# Patient Record
Sex: MALE | Race: WHITE | URBAN - METROPOLITAN AREA
[De-identification: names, ages, dates, MRNs, and addresses within clinical notes are randomized per-mention and may not be internally consistent; named-entity substitution may affect disease eponyms.]

---

## 2017-06-03 ENCOUNTER — INPATIENT (INPATIENT)
Facility: HOSPITAL | Age: 32
LOS: 0 days | Discharge: HOME | End: 2017-06-04
Attending: HOSPITALIST

## 2017-06-03 DIAGNOSIS — R56.9 UNSPECIFIED CONVULSIONS: ICD-10-CM

## 2017-06-03 DIAGNOSIS — M62.82 RHABDOMYOLYSIS: ICD-10-CM

## 2017-06-28 DIAGNOSIS — F17.210 NICOTINE DEPENDENCE, CIGARETTES, UNCOMPLICATED: ICD-10-CM

## 2017-06-28 DIAGNOSIS — F12.288 CANNABIS DEPENDENCE WITH OTHER CANNABIS-INDUCED DISORDER: ICD-10-CM

## 2017-06-28 DIAGNOSIS — F14.90 COCAINE USE, UNSPECIFIED, UNCOMPLICATED: ICD-10-CM

## 2017-06-28 DIAGNOSIS — R56.9 UNSPECIFIED CONVULSIONS: ICD-10-CM

## 2017-06-28 DIAGNOSIS — M62.82 RHABDOMYOLYSIS: ICD-10-CM

## 2017-06-28 DIAGNOSIS — R74.8 ABNORMAL LEVELS OF OTHER SERUM ENZYMES: ICD-10-CM

## 2017-06-28 DIAGNOSIS — M54.9 DORSALGIA, UNSPECIFIED: ICD-10-CM

## 2017-06-28 DIAGNOSIS — D72.828 OTHER ELEVATED WHITE BLOOD CELL COUNT: ICD-10-CM

## 2018-03-12 ENCOUNTER — OUTPATIENT (OUTPATIENT)
Dept: OUTPATIENT SERVICES | Facility: HOSPITAL | Age: 33
LOS: 1 days | Discharge: HOME | End: 2018-03-12

## 2018-03-12 DIAGNOSIS — Z31.440 ENCOUNTER OF MALE FOR TESTING FOR GENETIC DISEASE CARRIER STATUS FOR PROCREATIVE MANAGEMENT: ICD-10-CM

## 2018-12-10 ENCOUNTER — EMERGENCY (EMERGENCY)
Facility: HOSPITAL | Age: 33
LOS: 0 days | Discharge: HOME | End: 2018-12-10
Attending: EMERGENCY MEDICINE | Admitting: EMERGENCY MEDICINE

## 2018-12-10 VITALS
WEIGHT: 175.05 LBS | DIASTOLIC BLOOD PRESSURE: 57 MMHG | TEMPERATURE: 98 F | HEIGHT: 71 IN | HEART RATE: 79 BPM | RESPIRATION RATE: 18 BRPM | SYSTOLIC BLOOD PRESSURE: 103 MMHG

## 2018-12-10 DIAGNOSIS — R10.9 UNSPECIFIED ABDOMINAL PAIN: ICD-10-CM

## 2018-12-10 DIAGNOSIS — K46.9 UNSPECIFIED ABDOMINAL HERNIA WITHOUT OBSTRUCTION OR GANGRENE: ICD-10-CM
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
## 2018-12-10 DIAGNOSIS — R10.31 RIGHT LOWER QUADRANT PAIN: ICD-10-CM
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
## 2018-12-10 PROBLEM — Z00.00 ENCOUNTER FOR PREVENTIVE HEALTH EXAMINATION: Status: ACTIVE | Noted: 2018-12-10

## 2018-12-10 NOTE — PROGRESS NOTE ADULT - SUBJECTIVE AND OBJECTIVE BOX
WILLIAN ISRAEL [id number]  33y Male      HPI: 33 yr old male with c/o right groin pain and "popping" sensation, which he experienced after trying to move a 600 lb drum while at work today. HE then noticed a bulge in right groind area, which re-occurs if he bears down or stands up.       PAST MEDICAL & SURGICAL HISTORY:  s/p umbilical & LIH repair (205 0r 2016, Dr. Pinedo)        MEDICATIONS  (STANDING):    MEDICATIONS  (PRN):      Allergies    No Known Allergies    Intolerances    Social History:  (+) tobacco  (+)marijuana daily  (+)social ETOH    REVIEW OF SYSTEMS    [ ] A ten-point review of systems was otherwise negative except as noted.  [ ] Due to altered mental status/intubation, subjective information were not able to be obtained from the patient. History was obtained, to the extent possible, from review of the chart and collateral sources of information.      Vital Signs Last 24 Hrs  T(C): 36.4 (10 Dec 2018 11:51), Max: 36.4 (10 Dec 2018 11:51)  T(F): 97.6 (10 Dec 2018 11:51), Max: 97.6 (10 Dec 2018 11:51)  HR: 79 (10 Dec 2018 11:51) (79 - 79)  BP: 103/57 (10 Dec 2018 11:51) (103/57 - 103/57)  BP(mean): --  RR: 18 (10 Dec 2018 11:51) (18 - 18)  SpO2: --    PHYSICAL EXAM:  GENERAL: NAD, well-appearing  ABDOMEN: Soft, (+)reducible right inguinal hernia, (+) mildly tender; no skin changes noted      LABS:                      LFTs:         Coags:                RADIOLOGY & ADDITIONAL STUDIES:

## 2018-12-10 NOTE — ED PROVIDER NOTE - NSFOLLOWUPINSTRUCTIONS_ED_ALL_ED_FT
Hernia    A hernia is when fat or the intestines push through a weak area in the abdominal wall. This can occur around the belly button (umbilical hernia) or where the leg meets the lower abdomen (inguinal hernia). This creates a rounded lump (bulge). Hernias that can be pushed back into the belly are called reducible and those that cannot are called incarcerated. Hernias that are not reducible and lose their blood supply are called strangulated and require emergency surgery. Hernias can be caused or worsened when straining during bowel movements or lifting heavy objects as well as coughing or sneezing. Surgery may be helpful in treating this condition so follow up with a surgeon.    SEEK IMMEDIATE MEDICAL CARE IF YOU HAVE ANY OF THE FOLLOWING SYMPTOMS: worsening abdominal pain, change in color over the hernia, nausea/vomiting, or inability to have a bowel movement or pass gas.

## 2018-12-10 NOTE — ED PROVIDER NOTE - OBJECTIVE STATEMENT
32yo M presents to the ED c/o righr sided groin pain after moving a heavy object at work. Pt states that he was in severe pain at that time so someone called 911. Upon arrival of the ambulance, pt noted to have a lump to the right groin which he was able to push back in. Pt denies any urinary symptoms, no saddle anesthesia, no B/B incontinence. Pt has a h/o hernias in the past and had surgery done by Dr. Mayen.

## 2018-12-10 NOTE — CONSULT NOTE ADULT - SUBJECTIVE AND OBJECTIVE BOX
WILLIAN ISRAEL [id number]  33y Male      HPI: 33 yr old male with c/o right groin pain and "popping" sensation, which he experienced after trying to move a 600 lb drum while at work today. HE then noticed a bulge in right groind area, which re-occurs if he bears down or stands up.       PAST MEDICAL & SURGICAL HISTORY:  s/p umbilical & LIH repair (205 0r 2016, Dr. Pinedo)        MEDICATIONS  (STANDING):    MEDICATIONS  (PRN):      Allergies    No Known Allergies    Intolerances    Social History:  (+) tobacco  (+)marijuana daily  (+)social ETOH    REVIEW OF SYSTEMS    [ ] A ten-point review of systems was otherwise negative except as noted.  [ ] Due to altered mental status/intubation, subjective information were not able to be obtained from the patient. History was obtained, to the extent possible, from review of the chart and collateral sources of information.      Vital Signs Last 24 Hrs  T(C): 36.4 (10 Dec 2018 11:51), Max: 36.4 (10 Dec 2018 11:51)  T(F): 97.6 (10 Dec 2018 11:51), Max: 97.6 (10 Dec 2018 11:51)  HR: 79 (10 Dec 2018 11:51) (79 - 79)  BP: 103/57 (10 Dec 2018 11:51) (103/57 - 103/57)  BP(mean): --  RR: 18 (10 Dec 2018 11:51) (18 - 18)  SpO2: --    PHYSICAL EXAM:  GENERAL: NAD, well-appearing  ABDOMEN: Soft, (+)reducible right inguinal hernia, (+) mildly tender; no skin changes noted      LABS:  n/a    RADIOLOGY & ADDITIONAL STUDIES:  n/a WILLIAN ISRAEL [id number]  33y Male      HPI: 33 yr old male with c/o right groin pain and "popping" sensation, which he experienced after trying to move a 600 lb drum while at work today. HE then noticed a bulge in right groind area, which re-occurs if he bears down or stands up. No N/V;, unsure if he passed flatus      PAST MEDICAL & SURGICAL HISTORY:  s/p umbilical & LIH repair (20i5 0r 2016, Dr. Pinedo)    Home Medications:  none    MEDICATIONS  (STANDING):    MEDICATIONS  (PRN):      Allergies    No Known Allergies    Intolerances    Social History:  (+) tobacco  (+)marijuana daily  (+)social ETOH    REVIEW OF SYSTEMS    [ ] A ten-point review of systems was otherwise negative except as noted.  [ ] Due to altered mental status/intubation, subjective information were not able to be obtained from the patient. History was obtained, to the extent possible, from review of the chart and collateral sources of information.      Vital Signs Last 24 Hrs  T(C): 36.4 (10 Dec 2018 11:51), Max: 36.4 (10 Dec 2018 11:51)  T(F): 97.6 (10 Dec 2018 11:51), Max: 97.6 (10 Dec 2018 11:51)  HR: 79 (10 Dec 2018 11:51) (79 - 79)  BP: 103/57 (10 Dec 2018 11:51) (103/57 - 103/57)  BP(mean): --  RR: 18 (10 Dec 2018 11:51) (18 - 18)  SpO2: --    PHYSICAL EXAM:  GENERAL: NAD, well-appearing  ABDOMEN: Soft, (+)reducible right inguinal hernia, (+) mildly tender; no skin changes noted      LABS:  n/a    RADIOLOGY & ADDITIONAL STUDIES:  n/a

## 2018-12-10 NOTE — ED PROVIDER NOTE - PROGRESS NOTE DETAILS
spoke to surgical pa will come to evaluate patient surgical pa examined patient . she will discuss with attending . patient seen by surgical resident . they discussed with attending . patient will call today to get appointment for joaquín

## 2018-12-10 NOTE — ED ADULT NURSE NOTE - NSIMPLEMENTINTERV_GEN_ALL_ED
Implemented All Universal Safety Interventions:  [place] to call system. Call bell, personal items and telephone within reach. Instruct patient to call for assistance. Room bathroom lighting operational. Non-slip footwear when patient is off stretcher. Physically safe environment: no spills, clutter or unnecessary equipment. Stretcher in lowest position, wheels locked, appropriate side rails in place.

## 2018-12-10 NOTE — ED PROVIDER NOTE - NS ED ROS FT
Review of Systems    Constitutional: (-) fever/ chills (-) weight loss  Cardiovascular: (-) chest pain, (-) syncope (-) palpitations  Respiratory: (-) cough, (-) shortness of breath  Gastrointestinal: (-) vomiting, (-) diarrhea (-) abdominal pain, (+) right groin pain  Musculoskeletal: (-) neck pain, (-) back pain, (-) joint pain (-) pedal edema   Integumentary: (-) rash, (-) swelling

## 2018-12-10 NOTE — CONSULT NOTE ADULT - ASSESSMENT
Right inguinal hernia, reducible     - Case D/W Dr. Pinedo: no acute intervention. Pt melina F/U in office tomorrow for further evaluation and possible hernia repair   - Pt instructed upon reasons to return to ER Right inguinal hernia, reducible     - Case D/W Dr. Pinedo: no acute intervention. Pt may F/U in office tomorrow for further evaluation for possible hernia repair   - Pt instructed upon reasons to return to ER

## 2018-12-10 NOTE — ED PROVIDER NOTE - PHYSICAL EXAMINATION
VITAL SIGNS: I have reviewed nursing notes and confirm.  CONSTITUTIONAL: well-appearing, non-toxic, NAD  SKIN: Warm dry, normal skin turgor  CARD: RRR, no murmurs, rubs or gallops  RESP: clear to ausculation b/l.  No rales, rhonchi, or wheezing.  ABD: soft, + BS, non-tender, non-distended, no rebound or guarding. No CVA tenderness, (+) tenderness over the right suprapubic area, not able to palpate a bulge.   EXT: Full ROM, no bony tenderness, no pedal edema, no calf tenderness  NEURO: normal motor. normal sensory. CN II-XII intact. Cerebellar testing normal. Normal gait.  PSYCH: Cooperative, appropriate.

## 2018-12-10 NOTE — ED PROVIDER NOTE - ATTENDING CONTRIBUTION TO CARE
Pt with c/o painful lump to right groin after pushing heavy object.  Pt with easily reducible right hernia.  Surgery eval pt in the ED and will f/u as outpt.  Patient will be discharged from the ED. Verbal instructions were given, including instructions to return to ED immediately for any new, worsening, or concerning symptoms. Patient endorsed understanding. Written discharge instructions additionally given, including follow-up plan.

## 2018-12-10 NOTE — ED PROVIDER NOTE - CARE PROVIDER_API CALL
Jomar Pinedo), Surgery  [facility] [place] [address]  Phone: [phone]  Fax: [phone]

## 2018-12-11 ENCOUNTER — APPOINTMENT (OUTPATIENT)
Dept: SURGERY | Facility: CLINIC | Age: 33
End: 2018-12-11

## 2018-12-13 ENCOUNTER — APPOINTMENT (OUTPATIENT)
Age: 33
End: 2018-12-13
Payer: OTHER MISCELLANEOUS

## 2018-12-13 ENCOUNTER — APPOINTMENT (OUTPATIENT)
Dept: SURGERY | Facility: CLINIC | Age: 33
End: 2018-12-13

## 2018-12-13 ENCOUNTER — FORM ENCOUNTER (OUTPATIENT)
Age: 33
End: 2018-12-13

## 2018-12-13 VITALS
SYSTOLIC BLOOD PRESSURE: 110 MMHG | DIASTOLIC BLOOD PRESSURE: 80 MMHG | BODY MASS INDEX: 25.05 KG/M2 | WEIGHT: 175 LBS | HEIGHT: 70 IN

## 2018-12-13 DIAGNOSIS — F17.200 NICOTINE DEPENDENCE, UNSPECIFIED, UNCOMPLICATED: ICD-10-CM

## 2018-12-13 PROCEDURE — 99243 OFF/OP CNSLTJ NEW/EST LOW 30: CPT

## 2018-12-14 ENCOUNTER — OUTPATIENT (OUTPATIENT)
Dept: OUTPATIENT SERVICES | Facility: HOSPITAL | Age: 33
LOS: 1 days | Discharge: HOME | End: 2018-12-14

## 2018-12-14 VITALS
HEIGHT: 70 IN | TEMPERATURE: 98 F | RESPIRATION RATE: 18 BRPM | OXYGEN SATURATION: 97 % | WEIGHT: 175.05 LBS | HEART RATE: 74 BPM | DIASTOLIC BLOOD PRESSURE: 72 MMHG | SYSTOLIC BLOOD PRESSURE: 113 MMHG

## 2018-12-14 DIAGNOSIS — Z01.818 ENCOUNTER FOR OTHER PREPROCEDURAL EXAMINATION: ICD-10-CM

## 2018-12-14 DIAGNOSIS — K40.90 UNILATERAL INGUINAL HERNIA, WITHOUT OBSTRUCTION OR GANGRENE, NOT SPECIFIED AS RECURRENT: ICD-10-CM

## 2018-12-14 DIAGNOSIS — Z98.890 OTHER SPECIFIED POSTPROCEDURAL STATES: Chronic | ICD-10-CM

## 2018-12-14 DIAGNOSIS — N48.89 OTHER SPECIFIED DISORDERS OF PENIS: Chronic | ICD-10-CM

## 2018-12-14 LAB
ALBUMIN SERPL ELPH-MCNC: 4.5 G/DL — SIGNIFICANT CHANGE UP (ref 3.5–5.2)
ALP SERPL-CCNC: 90 U/L — SIGNIFICANT CHANGE UP (ref 30–115)
ALT FLD-CCNC: 18 U/L — SIGNIFICANT CHANGE UP (ref 0–41)
ANION GAP SERPL CALC-SCNC: 18 MMOL/L — HIGH (ref 7–14)
APPEARANCE UR: ABNORMAL
APTT BLD: 35.4 SEC — SIGNIFICANT CHANGE UP (ref 27–39.2)
AST SERPL-CCNC: 19 U/L — SIGNIFICANT CHANGE UP (ref 0–41)
BASOPHILS # BLD AUTO: 0.06 K/UL — SIGNIFICANT CHANGE UP (ref 0–0.2)
BASOPHILS NFR BLD AUTO: 0.7 % — SIGNIFICANT CHANGE UP (ref 0–1)
BILIRUB SERPL-MCNC: 0.6 MG/DL — SIGNIFICANT CHANGE UP (ref 0.2–1.2)
BILIRUB UR-MCNC: NEGATIVE — SIGNIFICANT CHANGE UP
BUN SERPL-MCNC: 13 MG/DL — SIGNIFICANT CHANGE UP (ref 10–20)
CALCIUM SERPL-MCNC: 9.3 MG/DL — SIGNIFICANT CHANGE UP (ref 8.5–10.1)
CHLORIDE SERPL-SCNC: 99 MMOL/L — SIGNIFICANT CHANGE UP (ref 98–110)
CO2 SERPL-SCNC: 25 MMOL/L — SIGNIFICANT CHANGE UP (ref 17–32)
COLOR SPEC: YELLOW — SIGNIFICANT CHANGE UP
COMMENT - URINE: SIGNIFICANT CHANGE UP
CREAT SERPL-MCNC: 0.9 MG/DL — SIGNIFICANT CHANGE UP (ref 0.7–1.5)
DIFF PNL FLD: NEGATIVE — SIGNIFICANT CHANGE UP
EOSINOPHIL # BLD AUTO: 0.34 K/UL — SIGNIFICANT CHANGE UP (ref 0–0.7)
EOSINOPHIL NFR BLD AUTO: 3.8 % — SIGNIFICANT CHANGE UP (ref 0–8)
GLUCOSE SERPL-MCNC: 82 MG/DL — SIGNIFICANT CHANGE UP (ref 70–99)
GLUCOSE UR QL: NEGATIVE MG/DL — SIGNIFICANT CHANGE UP
HCT VFR BLD CALC: 47.5 % — SIGNIFICANT CHANGE UP (ref 42–52)
HGB BLD-MCNC: 16.1 G/DL — SIGNIFICANT CHANGE UP (ref 14–18)
IMM GRANULOCYTES NFR BLD AUTO: 0.3 % — SIGNIFICANT CHANGE UP (ref 0.1–0.3)
INR BLD: 1.02 RATIO — SIGNIFICANT CHANGE UP (ref 0.65–1.3)
KETONES UR-MCNC: NEGATIVE — SIGNIFICANT CHANGE UP
LEUKOCYTE ESTERASE UR-ACNC: NEGATIVE — SIGNIFICANT CHANGE UP
LYMPHOCYTES # BLD AUTO: 3.26 K/UL — SIGNIFICANT CHANGE UP (ref 1.2–3.4)
LYMPHOCYTES # BLD AUTO: 36.3 % — SIGNIFICANT CHANGE UP (ref 20.5–51.1)
MCHC RBC-ENTMCNC: 28.6 PG — SIGNIFICANT CHANGE UP (ref 27–31)
MCHC RBC-ENTMCNC: 33.9 G/DL — SIGNIFICANT CHANGE UP (ref 32–37)
MCV RBC AUTO: 84.5 FL — SIGNIFICANT CHANGE UP (ref 80–94)
MONOCYTES # BLD AUTO: 0.51 K/UL — SIGNIFICANT CHANGE UP (ref 0.1–0.6)
MONOCYTES NFR BLD AUTO: 5.7 % — SIGNIFICANT CHANGE UP (ref 1.7–9.3)
NEUTROPHILS # BLD AUTO: 4.79 K/UL — SIGNIFICANT CHANGE UP (ref 1.4–6.5)
NEUTROPHILS NFR BLD AUTO: 53.2 % — SIGNIFICANT CHANGE UP (ref 42.2–75.2)
NITRITE UR-MCNC: NEGATIVE — SIGNIFICANT CHANGE UP
NRBC # BLD: 0 /100 WBCS — SIGNIFICANT CHANGE UP (ref 0–0)
PH UR: 7 — SIGNIFICANT CHANGE UP (ref 5–8)
PLATELET # BLD AUTO: 268 K/UL — SIGNIFICANT CHANGE UP (ref 130–400)
POTASSIUM SERPL-MCNC: 4.4 MMOL/L — SIGNIFICANT CHANGE UP (ref 3.5–5)
POTASSIUM SERPL-SCNC: 4.4 MMOL/L — SIGNIFICANT CHANGE UP (ref 3.5–5)
PROT SERPL-MCNC: 7.6 G/DL — SIGNIFICANT CHANGE UP (ref 6–8)
PROT UR-MCNC: NEGATIVE MG/DL — SIGNIFICANT CHANGE UP
PROTHROM AB SERPL-ACNC: 11.7 SEC — SIGNIFICANT CHANGE UP (ref 9.95–12.87)
RBC # BLD: 5.62 M/UL — SIGNIFICANT CHANGE UP (ref 4.7–6.1)
RBC # FLD: 11.7 % — SIGNIFICANT CHANGE UP (ref 11.5–14.5)
SODIUM SERPL-SCNC: 142 MMOL/L — SIGNIFICANT CHANGE UP (ref 135–146)
SP GR SPEC: 1.02 — SIGNIFICANT CHANGE UP (ref 1.01–1.03)
UROBILINOGEN FLD QL: 0.2 MG/DL — SIGNIFICANT CHANGE UP (ref 0.2–0.2)
WBC # BLD: 8.99 K/UL — SIGNIFICANT CHANGE UP (ref 4.8–10.8)
WBC # FLD AUTO: 8.99 K/UL — SIGNIFICANT CHANGE UP (ref 4.8–10.8)

## 2018-12-14 NOTE — H&P PST ADULT - PMH
Seizure  1 episode june 2017 Current smoker    H/O drug abuse  Past use in Teen yrs: Cocaine, Acid, Mushroom  Current Marijuana use  Seizure  1 episode june 2017

## 2018-12-14 NOTE — H&P PST ADULT - HISTORY OF PRESENT ILLNESS
32 y/o male reports right inguinal hernia "popped out" on 12/10/18 while at work; works in construction;  elected for procedure.   Denies chest pain, palp, SOB / DENNISON, cough, fever, recent URI / UTI, dizziness or syncope. Denies exertional activity limitations.     ** Had episode of Seizure June 2017 - worked up; thought to have resulted from "dehydration". No further episode. Not on any meds.

## 2018-12-15 PROBLEM — Z87.898 PERSONAL HISTORY OF OTHER SPECIFIED CONDITIONS: Chronic | Status: ACTIVE | Noted: 2018-12-14

## 2018-12-15 PROBLEM — R56.9 UNSPECIFIED CONVULSIONS: Chronic | Status: ACTIVE | Noted: 2018-12-14

## 2018-12-17 PROBLEM — F17.200 NICOTINE DEPENDENCE, UNSPECIFIED, UNCOMPLICATED: Chronic | Status: ACTIVE | Noted: 2018-12-14

## 2018-12-19 ENCOUNTER — OUTPATIENT (OUTPATIENT)
Dept: OUTPATIENT SERVICES | Facility: HOSPITAL | Age: 33
LOS: 1 days | Discharge: HOME | End: 2018-12-19

## 2018-12-19 ENCOUNTER — APPOINTMENT (OUTPATIENT)
Age: 33
End: 2018-12-19
Payer: OTHER MISCELLANEOUS

## 2018-12-19 ENCOUNTER — RESULT REVIEW (OUTPATIENT)
Age: 33
End: 2018-12-19

## 2018-12-19 VITALS — DIASTOLIC BLOOD PRESSURE: 65 MMHG | RESPIRATION RATE: 17 BRPM | SYSTOLIC BLOOD PRESSURE: 138 MMHG | HEART RATE: 64 BPM

## 2018-12-19 VITALS
OXYGEN SATURATION: 97 % | RESPIRATION RATE: 17 BRPM | WEIGHT: 175.05 LBS | SYSTOLIC BLOOD PRESSURE: 114 MMHG | HEART RATE: 77 BPM | DIASTOLIC BLOOD PRESSURE: 63 MMHG | HEIGHT: 70 IN | TEMPERATURE: 98 F

## 2018-12-19 DIAGNOSIS — Z98.890 OTHER SPECIFIED POSTPROCEDURAL STATES: Chronic | ICD-10-CM

## 2018-12-19 DIAGNOSIS — N48.89 OTHER SPECIFIED DISORDERS OF PENIS: Chronic | ICD-10-CM

## 2018-12-19 PROCEDURE — 49505 PRP I/HERN INIT REDUC >5 YR: CPT | Mod: RT

## 2018-12-19 PROCEDURE — 55520 REMOVAL OF SPERM CORD LESION: CPT | Mod: XS

## 2018-12-19 RX ORDER — SODIUM CHLORIDE 9 MG/ML
1000 INJECTION, SOLUTION INTRAVENOUS
Qty: 0 | Refills: 0 | Status: DISCONTINUED | OUTPATIENT
Start: 2018-12-19 | End: 2018-12-19

## 2018-12-19 RX ORDER — ONDANSETRON 8 MG/1
4 TABLET, FILM COATED ORAL ONCE
Qty: 0 | Refills: 0 | Status: DISCONTINUED | OUTPATIENT
Start: 2018-12-19 | End: 2018-12-19

## 2018-12-19 RX ORDER — OXYCODONE AND ACETAMINOPHEN 5; 325 MG/1; MG/1
2 TABLET ORAL ONCE
Qty: 0 | Refills: 0 | Status: DISCONTINUED | OUTPATIENT
Start: 2018-12-19 | End: 2018-12-19

## 2018-12-19 RX ORDER — HYDROMORPHONE HYDROCHLORIDE 2 MG/ML
1 INJECTION INTRAMUSCULAR; INTRAVENOUS; SUBCUTANEOUS
Qty: 0 | Refills: 0 | Status: DISCONTINUED | OUTPATIENT
Start: 2018-12-19 | End: 2018-12-19

## 2018-12-19 RX ORDER — DEXAMETHASONE 0.5 MG/5ML
8 ELIXIR ORAL ONCE
Qty: 0 | Refills: 0 | Status: DISCONTINUED | OUTPATIENT
Start: 2018-12-19 | End: 2018-12-19

## 2018-12-19 RX ADMIN — OXYCODONE AND ACETAMINOPHEN 2 TABLET(S): 5; 325 TABLET ORAL at 09:56

## 2018-12-19 RX ADMIN — HYDROMORPHONE HYDROCHLORIDE 1 MILLIGRAM(S): 2 INJECTION INTRAMUSCULAR; INTRAVENOUS; SUBCUTANEOUS at 09:23

## 2018-12-19 RX ADMIN — SODIUM CHLORIDE 125 MILLILITER(S): 9 INJECTION, SOLUTION INTRAVENOUS at 09:22

## 2018-12-19 RX ADMIN — HYDROMORPHONE HYDROCHLORIDE 1 MILLIGRAM(S): 2 INJECTION INTRAMUSCULAR; INTRAVENOUS; SUBCUTANEOUS at 09:58

## 2018-12-19 NOTE — ASU DISCHARGE PLAN (ADULT/PEDIATRIC). - ACTIVITY LEVEL
no sports/gym/no exercise/Light daily activity as tolerated is permitted, Please avoid any heavy lifting >10-15lbs, strenuous physical activity, straining with bowel movements, or heavy exercise./no heavy lifting

## 2018-12-19 NOTE — BRIEF OPERATIVE NOTE - PROCEDURE
<<-----Click on this checkbox to enter Procedure Inguinal hernia repair with mesh  12/19/2018    Active  [email or web address]

## 2018-12-19 NOTE — ASU DISCHARGE PLAN (ADULT/PEDIATRIC). - BATHING
shower only/You may shower tonight, your dressing is waterproof, avoid direct exposure to water stream, do not submerge in water, blot dry

## 2018-12-19 NOTE — BRIEF OPERATIVE NOTE - POST-OP DX
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

## 2018-12-19 NOTE — ASU DISCHARGE PLAN (ADULT/PEDIATRIC). - MEDICATION SUMMARY - MEDICATIONS TO TAKE
I will START or STAY ON the medications listed below when I get home from the hospital:    Percocet 5/325 oral tablet  -- 1 tab(s) by mouth every 6 hours, As Needed -for severe pain MDD:4 tablets  -- Caution federal law prohibits the transfer of this drug to any person other  than the person for whom it was prescribed.  May cause drowsiness.  Alcohol may intensify this effect.  Use care when operating dangerous machinery.  This prescription cannot be refilled.  This product contains acetaminophen.  Do not use  with any other product containing acetaminophen to prevent possible liver damage.  Using more of this medication than prescribed may cause serious breathing problems.    -- Indication: For Pain medication    Multiple Vitamins oral tablet  -- 1 tab(s) by mouth once a day  -- Indication: For Home medications

## 2018-12-19 NOTE — ASU PATIENT PROFILE, ADULT - PMH
Current smoker    H/O drug abuse  Past use in Teen yrs: Cocaine, Acid, Mushroom  Current Marijuana use  Seizure  1 episode june 2017

## 2018-12-20 LAB — SURGICAL PATHOLOGY STUDY: SIGNIFICANT CHANGE UP

## 2018-12-26 DIAGNOSIS — D17.6 BENIGN LIPOMATOUS NEOPLASM OF SPERMATIC CORD: ICD-10-CM

## 2018-12-26 DIAGNOSIS — K40.90 UNILATERAL INGUINAL HERNIA, WITHOUT OBSTRUCTION OR GANGRENE, NOT SPECIFIED AS RECURRENT: ICD-10-CM

## 2018-12-26 DIAGNOSIS — F17.210 NICOTINE DEPENDENCE, CIGARETTES, UNCOMPLICATED: ICD-10-CM

## 2018-12-26 DIAGNOSIS — R56.9 UNSPECIFIED CONVULSIONS: ICD-10-CM

## 2018-12-27 ENCOUNTER — APPOINTMENT (OUTPATIENT)
Age: 33
End: 2018-12-27
Payer: OTHER MISCELLANEOUS

## 2018-12-27 DIAGNOSIS — K40.90 UNILATERAL INGUINAL HERNIA, W/OUT OBSTRUCTION OR GANGRENE, NOT SPECIFIED AS RECURRENT: ICD-10-CM

## 2018-12-27 PROCEDURE — 99024 POSTOP FOLLOW-UP VISIT: CPT

## 2019-01-31 ENCOUNTER — APPOINTMENT (OUTPATIENT)
Dept: SURGERY | Facility: CLINIC | Age: 34
End: 2019-01-31

## 2019-03-20 ENCOUNTER — EMERGENCY (EMERGENCY)
Facility: HOSPITAL | Age: 34
LOS: 0 days | Discharge: HOME | End: 2019-03-20
Attending: EMERGENCY MEDICINE | Admitting: EMERGENCY MEDICINE

## 2019-03-20 VITALS
SYSTOLIC BLOOD PRESSURE: 138 MMHG | TEMPERATURE: 98 F | HEART RATE: 126 BPM | OXYGEN SATURATION: 99 % | DIASTOLIC BLOOD PRESSURE: 88 MMHG | RESPIRATION RATE: 24 BRPM | HEIGHT: 70 IN | WEIGHT: 169.98 LBS

## 2019-03-20 DIAGNOSIS — R00.2 PALPITATIONS: ICD-10-CM

## 2019-03-20 DIAGNOSIS — F17.200 NICOTINE DEPENDENCE, UNSPECIFIED, UNCOMPLICATED: ICD-10-CM

## 2019-03-20 DIAGNOSIS — R56.9 UNSPECIFIED CONVULSIONS: ICD-10-CM

## 2019-03-20 DIAGNOSIS — Z79.891 LONG TERM (CURRENT) USE OF OPIATE ANALGESIC: ICD-10-CM

## 2019-03-20 DIAGNOSIS — Z98.890 OTHER SPECIFIED POSTPROCEDURAL STATES: Chronic | ICD-10-CM

## 2019-03-20 DIAGNOSIS — Z79.899 OTHER LONG TERM (CURRENT) DRUG THERAPY: ICD-10-CM

## 2019-03-20 DIAGNOSIS — N48.89 OTHER SPECIFIED DISORDERS OF PENIS: Chronic | ICD-10-CM

## 2019-03-20 DIAGNOSIS — F14.10 COCAINE ABUSE, UNCOMPLICATED: ICD-10-CM

## 2019-03-20 DIAGNOSIS — Z98.890 OTHER SPECIFIED POSTPROCEDURAL STATES: ICD-10-CM

## 2019-03-20 LAB
ALBUMIN SERPL ELPH-MCNC: 4.9 G/DL — SIGNIFICANT CHANGE UP (ref 3.5–5.2)
ALP SERPL-CCNC: 108 U/L — SIGNIFICANT CHANGE UP (ref 30–115)
ALT FLD-CCNC: 20 U/L — SIGNIFICANT CHANGE UP (ref 0–41)
ANION GAP SERPL CALC-SCNC: 19 MMOL/L — HIGH (ref 7–14)
APAP SERPL-MCNC: <5 UG/ML — LOW (ref 10–30)
AST SERPL-CCNC: 29 U/L — SIGNIFICANT CHANGE UP (ref 0–41)
BILIRUB SERPL-MCNC: 1.9 MG/DL — HIGH (ref 0.2–1.2)
BUN SERPL-MCNC: 10 MG/DL — SIGNIFICANT CHANGE UP (ref 10–20)
CALCIUM SERPL-MCNC: 9.3 MG/DL — SIGNIFICANT CHANGE UP (ref 8.5–10.1)
CHLORIDE SERPL-SCNC: 97 MMOL/L — LOW (ref 98–110)
CO2 SERPL-SCNC: 22 MMOL/L — SIGNIFICANT CHANGE UP (ref 17–32)
CREAT SERPL-MCNC: 1.1 MG/DL — SIGNIFICANT CHANGE UP (ref 0.7–1.5)
GLUCOSE SERPL-MCNC: 103 MG/DL — HIGH (ref 70–99)
HCT VFR BLD CALC: 48.7 % — SIGNIFICANT CHANGE UP (ref 42–52)
HGB BLD-MCNC: 16.6 G/DL — SIGNIFICANT CHANGE UP (ref 14–18)
MCHC RBC-ENTMCNC: 29.7 PG — SIGNIFICANT CHANGE UP (ref 27–31)
MCHC RBC-ENTMCNC: 34.1 G/DL — SIGNIFICANT CHANGE UP (ref 32–37)
MCV RBC AUTO: 87.3 FL — SIGNIFICANT CHANGE UP (ref 80–94)
NRBC # BLD: 0 /100 WBCS — SIGNIFICANT CHANGE UP (ref 0–0)
PLATELET # BLD AUTO: 277 K/UL — SIGNIFICANT CHANGE UP (ref 130–400)
POTASSIUM SERPL-MCNC: 3.7 MMOL/L — SIGNIFICANT CHANGE UP (ref 3.5–5)
POTASSIUM SERPL-SCNC: 3.7 MMOL/L — SIGNIFICANT CHANGE UP (ref 3.5–5)
PROT SERPL-MCNC: 7.6 G/DL — SIGNIFICANT CHANGE UP (ref 6–8)
RBC # BLD: 5.58 M/UL — SIGNIFICANT CHANGE UP (ref 4.7–6.1)
RBC # FLD: 12.2 % — SIGNIFICANT CHANGE UP (ref 11.5–14.5)
SALICYLATES SERPL-MCNC: <0.3 MG/DL — LOW (ref 4–30)
SODIUM SERPL-SCNC: 138 MMOL/L — SIGNIFICANT CHANGE UP (ref 135–146)
TROPONIN T SERPL-MCNC: <0.01 NG/ML — SIGNIFICANT CHANGE UP
WBC # BLD: 17.18 K/UL — HIGH (ref 4.8–10.8)
WBC # FLD AUTO: 17.18 K/UL — HIGH (ref 4.8–10.8)

## 2019-03-20 NOTE — ED PROVIDER NOTE - CHPI ED SYMPTOMS NEG
no back pain/no dizziness/no fever/no nausea/no diaphoresis/no shortness of breath/no vomiting/no chills/no chest pain/no cough/no syncope

## 2019-03-20 NOTE — ED PROVIDER NOTE - CLINICAL SUMMARY MEDICAL DECISION MAKING FREE TEXT BOX
pt pw palpitations after cocaine use ekg no ischemic  pt asymptomatic,   labs wnl -   VS improved  dcd in stabel condition Patient to be discharged from ED. Any available test results were discussed with patient and/or family. Verbal instructions given, including instructions to return to ED immediately for any new, worsening, or concerning symptoms. Patient endorsed understanding. Written discharge instructions additionally given, including follow-up plan.

## 2019-03-20 NOTE — ED PROVIDER NOTE - NSFOLLOWUPINSTRUCTIONS_ED_ALL_ED_FT
Palpitations  ImageA palpitation is the feeling that your heartbeat is irregular or is faster than normal. It may feel like your heart is fluttering or skipping a beat. Palpitations are usually not a serious problem. They may be caused by many things, including smoking, caffeine, alcohol, stress, and certain medicines. Although most causes of palpitations are not serious, palpitations can be a sign of a serious medical problem. In some cases, you may need further medical evaluation.    Follow these instructions at home:  Pay attention to any changes in your symptoms. Take these actions to help with your condition:    Avoid the following:    Caffeinated coffee, tea, soft drinks, diet pills, and energy drinks.  Chocolate.  Alcohol.    Do not use any tobacco products, such as cigarettes, chewing tobacco, and e-cigarettes. If you need help quitting, ask your health care provider.  Try to reduce your stress and anxiety. Things that can help you relax include:    Yoga.  Meditation.  Physical activity, such as swimming, jogging, or walking.  Biofeedback. This is a method that helps you learn to use your mind to control things in your body, such as your heartbeats.    Get plenty of rest and sleep.  Take over-the-counter and prescription medicines only as told by your health care provider.  Keep all follow-up visits as told by your health care provider. This is important.    Contact a health care provider if:  You continue to have a fast or irregular heartbeat after 24 hours.  Your palpitations occur more often.  Get help right away if:  You have chest pain or shortness of breath.  You have a severe headache.  You feel dizzy or you faint.  This information is not intended to replace advice given to you by your health care provider. Make sure you discuss any questions you have with your health care provider.

## 2019-03-20 NOTE — ED ADULT NURSE NOTE - NSIMPLEMENTINTERV_GEN_ALL_ED
Implemented All Universal Safety Interventions:  [place] to call system. Call bell, personal items and telephone within reach. Instruct patient to call for assistance. Room bathroom lighting operational. Non-slip footwear when patient is off stretcher. Physically safe environment: no spills, clutter or unnecessary equipment. Stretcher in lowest position, wheels locked, appropriate side rails in place.

## 2019-03-20 NOTE — ED PROVIDER NOTE - OBJECTIVE STATEMENT
33 year old male past medical history of drug abuse states that tonight he was partying and did cocaine and started to have palpitations. denies shortness of breath

## 2019-03-20 NOTE — ED PROVIDER NOTE - ATTENDING CONTRIBUTION TO CARE
I personally evaluated the patient. I reviewed the Resident’s or Physician Assistant’s note (as assigned above), and agree with the findings and plan except as documented in my note.  33yM p/w palpitations  after using cocaine  tonight - no chest pain n o headache  syncope -  currently feels asymptomatic  no  other c ingestion no si hi.  PE  Alert nontoxic, perrl eomi, 4 mm b/l reactive CVS RRR, Resp CTA no tachypnea no hyperpnea, Abd soft nontender   nondistended ,, No le edema, no skin lesions

## 2019-08-11 ENCOUNTER — TRANSCRIPTION ENCOUNTER (OUTPATIENT)
Age: 34
End: 2019-08-11

## 2019-09-03 ENCOUNTER — TRANSCRIPTION ENCOUNTER (OUTPATIENT)
Age: 34
End: 2019-09-03

## 2019-10-29 ENCOUNTER — APPOINTMENT (OUTPATIENT)
Dept: SURGERY | Facility: CLINIC | Age: 34
End: 2019-10-29

## 2019-12-13 ENCOUNTER — TRANSCRIPTION ENCOUNTER (OUTPATIENT)
Age: 34
End: 2019-12-13

## 2021-05-07 NOTE — ASU DISCHARGE PLAN (ADULT/PEDIATRIC). - =======================================================================
[facility]  TELEMEDICINE Progress Note  Marshel Buerger, DO      The risks and benefits of converting to a virtual visit were discussed in light of the current infectious disease epidemic. Patient also understood that insurance coverage and co-pays are up to their individual insurance plans. Patient identification was verified at the start of the visit. Monster Kline  1952    05/10/21    Patient location: Home address on file  Provider location: [de-identified] home    Chief Complaint:   Monster Kline is a 79 y.o. patient who is here for respiratory infection. HPI:   onset 10 days, low grade fever, sinus congestion/pressure, chest congestion,  productive cough, loss of appetite  Temps of 99.5 and 99.2. Has experienced sinus infection before and he thinks this was almost a past infection. Denies any loss of taste or smell. Completed the 2 dose Covid vaccination on March 9. ROS negative for headache, vision changes, chest pain, shortness of breath, abdominal pain, urinary sx, bowel changes. Past medical, surgical, and social history reviewed. Medications and allergies reviewed. Allergies   Allergen Reactions    Hctz [Hydrochlorothiazide]      Seizure due to low sodium    Lisinopril Other (See Comments)     Cough       Prior to Visit Medications    Medication Sig Taking?  Authorizing Provider   predniSONE (DELTASONE) 20 MG tablet Take 1 tablet by mouth daily for 5 days Yes Nicanor Miguel,    levoFLOXacin (LEVAQUIN) 500 MG tablet Take 1 tablet by mouth daily for 10 days Yes Nicanor Miguel, DO   pravastatin (PRAVACHOL) 40 MG tablet TAKE 1 TABLET BY MOUTH EVERY DAY Yes Newton De La Cruz MD   losartan (COZAAR) 100 MG tablet TAKE 1 TABLET BY MOUTH EVERY DAY Yes Newton De La Cruz MD   amLODIPine (NORVASC) 10 MG tablet TAKE 1 TABLET BY MOUTH EVERY DAY Yes Newton De La Cruz MD   nitroGLYCERIN (NITROSTAT) 0.4 MG SL tablet Place 1 tablet under the tongue every 5 minutes as needed for Chest pain Yes Guille Springer MD   ibuprofen (ADVIL;MOTRIN) 200 MG tablet Take 200 mg by mouth every 6 hours as needed for Pain Yes Historical Provider, MD   aspirin 81 MG tablet Take 81 mg by mouth daily. Yes Historical Provider, MD   tamsulosin (FLOMAX) 0.4 MG capsule Take 0.4 mg by mouth daily. Yes Historical Provider, MD   Glucosamine-Chondroitin 500-400 MG CAPS Take 1 capsule by mouth daily. Yes Historical Provider, MD   hydroCHLOROthiazide (HYDRODIURIL) 25 MG tablet TAKE 1 TABLET BY MOUTH EVERY DAY IN THE MORNING  Patient taking differently: 12.5 mg   Alyse Henry MD   Crisaborole (EUCRISA) 2 % OINT Apply bid prn rash. Patient not taking: Reported on 5/7/2021  Alyse Henry MD   mometasone (ELOCON) 0.1 % cream Apply topically twice daily. Patient not taking: Reported on 5/7/2021  Ingrid Ramírez MD          Patient-Reported Vitals 12/4/2020   Patient-Reported Weight 163lb   Patient-Reported Height 5ft4in   Patient-Reported Systolic 960   Patient-Reported Diastolic 89   Patient-Reported Pulse 90   Patient-Reported Temperature 97.7      There were no vitals filed for this visit.    Wt Readings from Last 3 Encounters:   01/04/21 162 lb (73.5 kg)   09/28/20 163 lb (73.9 kg)   09/17/20 159 lb 12.8 oz (72.5 kg)     BP Readings from Last 3 Encounters:   01/04/21 (!) 151/89   09/28/20 138/83   09/17/20 138/84       Patient Active Problem List   Diagnosis    CAD (coronary artery disease)    BPH (benign prostatic hyperplasia)    Essential hypertension    Hereditary hemochromatosis (HCC)    Abnormal myocardial perfusion study    S/P PTCA (percutaneous transluminal coronary angioplasty)    LV dysfunction    Leukocytopenia    Elevated fasting glucose    Hypokalemia    Leukopenia    DDD (degenerative disc disease), lumbar    Osteoarthritis of left AC (acromioclavicular) joint       Immunization History   Administered Date(s) Administered    COVID-19, Moderna, PF, 100mcg/0.5mL 02/09/2021, 03/09/2021    Influenza Virus Vaccine 08/27/2014, 09/16/2016, 10/01/2018    Influenza, High Dose (Fluzone 65 yrs and older) 10/10/2019    Influenza, High-dose, Quadv, 65 yrs +, IM (Fluzone) 09/17/2020    Pneumococcal Conjugate 13-valent (Liam Jointer) 10/10/2019    Td, unspecified formulation 03/12/2018    Tdap (Boostrix, Adacel) 10/21/2007    Zoster Live (Zostavax) 05/25/2016       Past Medical History:   Diagnosis Date    BPH (benign prostatic hyperplasia)     CAD (coronary artery disease)     Chronic prostatitis 1992, 4/17    DDD (degenerative disc disease), lumbar     L3- S1    Elevated fasting glucose 6/16    GERD (gastroesophageal reflux disease)     Hemochromatosis 2001    Hypochloremia 6/16    Hypokalemia 6/16    Leukocytopenia 6/16    Leukopenia     Lumbar disc herniation     L5-S1    Lumbar stenosis     per MRI 7/09; L2-S1    LV dysfunction 2/16    EF=40-45%    Melanoma in situ of back ([facility] [address] 3/07    Metatarsal stress fracture 11/95    Osteoarthritis of left AC (acromioclavicular) joint 09/2020    Tear of MCL (medial collateral ligament) of knee 5/01    Unspecified essential hypertension      Past Surgical History:   Procedure Laterality Date    COLONOSCOPY  12/04    Dr. Xiomy Bolden      6/2012    [address] [facility]    right    LUMBAR LAMINECTOMY  9/09    PROSTATE BIOPSY  6/10    SKIN CANCER EXCISION  3/07    back; melanoma in-situ    UPPER GASTROINTESTINAL ENDOSCOPY  7/00    Dr. Rito Ramos     Family History   Problem Relation Age of Onset    Cancer Mother 80        esophageal    Heart Attack Father 78    Cancer Maternal Grandmother 80        esophageal    Heart Attack Maternal Grandfather     Seizures Sister     Mental Illness Brother      Social History     Socioeconomic History    Marital status:      Spouse name: Not on file    Number of children: Not on file    Years of education: Not on file   [facility] education level: Not on file   Occupational History    Not on file   Social Needs    Financial resource strain: Not on file    Food insecurity     Worry: Not on file     Inability: Not on file    Transportation needs     Medical: Not on file     Non-medical: Not on file   Tobacco Use    Smoking status: Never Smoker    Smokeless tobacco: Never Used   Substance and Sexual Activity    Alcohol use: Yes     Alcohol/week: 8.0 standard drinks     Types: 8 Cans of beer per week    Drug use: No    Sexual activity: Yes     Partners: Female   Lifestyle    Physical activity     Days per week: Not on file     Minutes per session: Not on file    Stress: Not on file   Relationships    Social connections     Talks on phone: Not on file     Gets together: Not on file     Attends Jew service: Not on file     Active member of club or organization: Not on file     Attends meetings of clubs or organizations: Not on file     Relationship status: Not on file    Intimate partner violence     Fear of current or ex partner: Not on file     Emotionally abused: Not on file     Physically abused: Not on file     Forced sexual activity: Not on file   Other Topics Concern    Not on file   Social History Narrative    Not on file       O: There were no vitals taken for this visit. Physical Exam  PHYSICAL EXAMINATION:  [ INSTRUCTIONS:  \"[x]\" Indicates a positive item  \"[]\" Indicates a negative item  -- DELETE ALL ITEMS NOT EXAMINED]  Vital Signs: (As obtained by patient/caregiver or practitioner observation)    Constitutional: [x] Appears well-developed and well-nourished [x] No apparent distress      [] Abnormal-   Mental status  [x] Alert and awake  [x] Oriented to person/place/time [x]Able to follow commands      Eyes:  EOM    [x]  Normal  [] Abnormal-  Sclera  [x]  Normal  [] Abnormal -         Discharge [x]  None visible  [] Abnormal -    HENT:   [x] Normocephalic, atraumatic.   [] Abnormal   [] Mouth/Throat: Mucous membranes are moist.     External Ears [x] Normal  [] Abnormal-     Neck: [x] No visualized mass     Pulmonary/Chest: [x] Respiratory effort normal.  [x] No visualized signs of difficulty breathing or respiratory distress        [] Abnormal-      Musculoskeletal:   [] Normal gait with no signs of ataxia         [x] Normal range of motion of neck        [] Abnormal-       Neurological:        [x] No Facial Asymmetry (Cranial nerve 7 motor function) (limited exam to video visit)          [x] No gaze palsy        [] Abnormal-         Skin:        [x] No significant exanthematous lesions or discoloration noted on facial skin         [] Abnormal-            Psychiatric:       [x] Normal Affect [] No Hallucinations        [] Abnormal-     Other pertinent observable physical exam findings- n/a worn during good    Due to this being a TeleHealth encounter, evaluation of the following organ systems is limited: Vitals/Constitutional/EENT/Resp/CV/GI//MS/Neuro/Skin/Heme-Lymph-Imm. ASSESSMENT   Diagnosis Orders   1. Acute bacterial sinusitis  predniSONE (DELTASONE) 20 MG tablet    levoFLOXacin (LEVAQUIN) 500 MG tablet       #1: The risks, benefits, potential side effects and barriers to medication use were addressed today. Understanding was acknowledged. Patient asked to follow-up if condition(s) do not improve as anticipated. PLAN          Time spent on encounter (to include any same day medical record review): 20 minutes  Established E/M: 10-19 (09517), 20-29 (87195), 30-39 (45175), 40-54 (95412)   New E/M: 15-29 (91638), 30-44 (47815), 45-59 (11046), 60-74 (53029)  Telephone E/M: 5-10 (14012), 11-20 (05798), 21-30 (62596)    If applicable, see additional patient information and instructions under \"Patient Instructions. \"    Return if symptoms worsen or fail to improve. There are no Patient Instructions on file for this visit.         Please note a portion of this chart was generated using dragon dictation software. Although every effort was made to ensure the accuracy of this automated transcription, some errors in transcription may have occurred. Pursuant to the emergency declaration under the [facility], [facility] waiver authority and the [employer] Act, this Virtual  Visit was conducted, with patient's consent, to reduce the patient's risk of exposure to COVID-19 and provide continuity of care for an established patient. Services were provided through a video synchronous discussion virtually to substitute for in-person clinic visit. Patient was instructed that the AVS is available on My Chart or was emailed to the patient if not on My Chart. Lab orders were emailed to patient if they do not use a [facility] lab. Any work notes were sent to patient through My Chart or email. Statement Selected

## 2021-08-13 NOTE — ED PROVIDER NOTE - HISTORY ATTESTATION, MLM
Date Form Received: 8/12/21   Authorization: Yes  Date Sent for Auth:   Type of Form:   Company: [employer]  Where form complete:Fx to [phone]  Comment: Additional information needed for DX arthritis    I have reviewed and confirmed nurses' notes...

## 2022-06-28 NOTE — PACU DISCHARGE NOTE - AIRWAY PATENCY:
IR Brief Postoperative Note    Scottie Chiang  YOB: 1967  [id number]    Pre-operative Diagnosis: esrd; PD infection    Post-operative Diagnosis: Same    Procedure: tunneled right IJ dialysis cath, ok for use    Anesthesia: mod    Surgeons/Assistants: abbie    Estimated Blood Loss: Minimal    Complications: none    Specimens: were not obtained    See full procedure dictation to follow      Chuck Huggins MD MD  6/28/2022 Satisfactory

## 2022-08-01 ENCOUNTER — APPOINTMENT (OUTPATIENT)
Dept: ORTHOPEDIC SURGERY | Facility: CLINIC | Age: 37
End: 2022-08-01

## 2022-08-01 VITALS — BODY MASS INDEX: 25.05 KG/M2 | WEIGHT: 175 LBS | HEIGHT: 70 IN

## 2022-08-01 PROCEDURE — 99072 ADDL SUPL MATRL&STAF TM PHE: CPT

## 2022-08-01 PROCEDURE — 99213 OFFICE O/P EST LOW 20 MIN: CPT

## 2022-08-03 NOTE — HISTORY OF PRESENT ILLNESS
[de-identified] : Patient is a 36-year-old male who reports office for subsequent re-evaluation of his left shoulder pain. Patient was initially seen in our office in February and diagnosed with shoulder tendinitis. He was treated with a corticosteroid injection and referred to physical therapy. Due to minimal response to physical therapy when he was seen in our office on the 7th of April 2022 an MRI of the shoulder was ordered. This confirmed the diagnosis of tendinosis/tendinitis of the infraspinatus, supraspinatus and biceps tendon. He has continued with physical therapy and has remained out of work since February. At this point he endorses pain only with certain movements but overall he is doing much better.\par \par Patient finished complete course of Physical therapy in Mid June 2022. He continues to feel better when at rest but his job is very physically demanding. 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 He has been taking ibuprofen PRN for severe pain.

## 2022-08-03 NOTE — ASSESSMENT
[FreeTextEntry1] : Patient is doing significantly better since initially seen.  He has been able to return to work with no restrictions however the repetitive nature of his work as well as the type of work that he does sometimes re-examined  exacerbates the pain.  His last cortisone injection was  approximately 6 months ago.  Due to this he was offered and verbally consented to a 2nd cortisone injection to the shoulder.  He tolerated the procedure well.  He will follow up for repeat evaluation in 6 months  with Sports Medicine.\par \par   He expressed understanding of outlined care plan with no additional questions or concerns\par \par This visit was seen under the direct supervision of Dr. Adis Barrientos

## 2022-08-03 NOTE — IMAGING
[de-identified] : Left Shoulder: Inspection of the shoulder/upper arm is as follows: no swelling, no erythema, no ecchymosis, no atrophy, no high-riding clavicle, no deformity and no inflammation. Palpation of the shoulder/upper arm is as follows: Tenderness is noted at the anterior shoulder.. no instability at AC joint, no instability at SC joint and no tenderness at AC joint. Range of motion of the shoulder is as follows: Motion is assessed sitting. The pain at end range of motions is mild Forward flexion 0-180 degrees, Abduction 0-180 degrees, Internal Rotation 0-70 degrees and External Rotation 0-90 degrees. Anterior shoulder pain noted with end degrees of flexion with forward flexion and abduction Strength of the shoulder is as follows: Strength is improving. and There is pain with strength testing. Forward flexion 5/5, Abduction 5/5, External Rotation 5/5 and Internal Rotation 5/5 Neurological testing of the shoulder is as follows: No sensory deficits and motor and sensor intact distally.

## 2022-08-03 NOTE — PROCEDURE
[Large Joint Injection] : Large joint injection [Left] : of the left [Shoulder] : shoulder [Pain] : pain [Inflammation] : inflammation [X-ray evidence of Osteoarthritis on this or prior visit] : x-ray evidence of Osteoarthritis on this or prior visit [Alcohol] : alcohol [___ cc    1%] : Lidocaine ~Vcc of 1%  [___ cc    4mg] : Dexamethasone (Decadron) ~Vcc of 4 mg  [] : Patient tolerated procedure well [Call if redness, pain or fever occur] : call if redness, pain or fever occur [Apply ice for 15min out of every hour for the next 12-24 hours as tolerated] : apply ice for 15 minutes out of every hour for the next 12-24 hours as tolerated [Patient was advised to rest the joint(s) for ____ days] : patient was advised to rest the joint(s) for [unfilled] days [Previous OTC use and PT nontherapeutic] : patient has tried OTC's including aspirin, Ibuprofen, Aleve, etc or prescription NSAIDS, and/or exercises at home and/or physical therapy without satisfactory response [Patient had decreased mobility in the joint] : patient had decreased mobility in the joint [Risks, benefits, alternatives discussed / Verbal consent obtained] : the risks benefits, and alternatives have been discussed, and verbal consent was obtained

## 2022-08-03 NOTE — PHYSICAL EXAM
[Normal Coordination] : normal coordination [Normal DTR UE/LE] : normal DTR UE/LE  [Normal Sensation] : normal sensation [Normal Mood and Affect] : normal mood and affect [Orientated] : orientated [Normal Skin] : normal skin [No Rash] : no rash [No Ulcers] : no ulcers [No Lesions] : no lesions [No obvious lymphadenopathy in areas examined] : no obvious lymphadenopathy in areas examined [Left] : left shoulder [] : no scapular winging

## 2023-01-18 NOTE — ASU PATIENT PROFILE, ADULT - PRESSURE ULCER(S)
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 \par \par ## Iron Deficiency anemia\par s/p multiple IV Venofer 200 mg infusions - last in 7/2022\par -Labs are drawn in the office, reviewed, analyzed, and discussed\par H/H and Ferritin are acceptable - no further IV iron needed. 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 Smoked 1 pack per 2 weeks x15 years\par \par Patient had multiple questions which were answered to satisfaction\par \par Labs in 6 months or earlier if any problems arise. \par cbc, cmp,  b12/folate, iron panel, urine/serum multiple myeloma panel.\par OV few days later
no

## 2023-05-03 ENCOUNTER — APPOINTMENT (OUTPATIENT)
Dept: ORTHOPEDIC SURGERY | Facility: CLINIC | Age: 38
End: 2023-05-03
Payer: COMMERCIAL

## 2023-05-03 DIAGNOSIS — M75.22 BICIPITAL TENDINITIS, LEFT SHOULDER: ICD-10-CM

## 2023-05-03 DIAGNOSIS — M75.82 OTHER SHOULDER LESIONS, LEFT SHOULDER: ICD-10-CM

## 2023-05-03 PROCEDURE — 20610 DRAIN/INJ JOINT/BURSA W/O US: CPT | Mod: LT

## 2023-05-03 PROCEDURE — 99213 OFFICE O/P EST LOW 20 MIN: CPT | Mod: ACP,25

## 2023-05-05 PROBLEM — M75.82 TENDINITIS OF LEFT ROTATOR CUFF: Status: ACTIVE | Noted: 2022-08-01

## 2023-05-05 PROBLEM — M75.22 TENDONITIS OF UPPER BICEPS TENDON OF LEFT SHOULDER: Status: ACTIVE | Noted: 2022-08-01

## 2023-05-05 NOTE — HISTORY OF PRESENT ILLNESS
[de-identified] : Patient is a 36-year-old male who reports office for subsequent re-evaluation of his left shoulder pain. Patient was initially seen in our office in February 2022 and diagnosed with shoulder tendinitis. He was treated with a corticosteroid injection and referred to physical therapy. Due to minimal response to physical therapy when he was seen in our office on the 7th of April 2022 an MRI of the shoulder was ordered. This confirmed the diagnosis of tendinosis/tendinitis of the infraspinatus, supraspinatus and biceps tendon. He has continued with physical therapy and has remained out of work since February. He finished his course of PT in Mid June 2022. He was feeling better and was told to follow up PRN. Coming in today 5/3/23 for re-eval due to recent exacerbation of shoulder pain. \par \par \par \par

## 2023-05-05 NOTE — IMAGING
[de-identified] : Left Shoulder: Inspection of the shoulder/upper arm is as follows: no swelling, no erythema, no ecchymosis, no atrophy, no high-riding clavicle, no deformity and no inflammation. Palpation of the shoulder/upper arm is as follows: Minimal Tenderness is noted at the anterior shoulder.. no instability at AC joint, no instability at SC joint and no tenderness at AC joint. Range of motion of the shoulder is as follows: Motion is assessed sitting. The pain at end range of motions is mild Forward flexion 0-180 degrees, Abduction 0-180 degrees, Internal Rotation 0-70 degrees and External Rotation 0-90 degrees. Anterior shoulder pain noted with end degrees of flexion with forward flexion and abduction Strength of the shoulder is as follows: Strength is improving. and There is pain with strength testing. Forward flexion 5/5, Abduction 5/5, External Rotation 5/5 and Internal Rotation 5/5 Neurological testing of the shoulder is as follows: No sensory deficits and motor and sensor intact distally.

## 2023-05-05 NOTE — ASSESSMENT
[FreeTextEntry1] : Patient was doing significantly better but recently due to the demands of his job had an acute exacerbation.  Coming in today requesting a cortisone injection.  It has been 8 months since his last cortisone injection so we are able to do that again today.  Reinforced with him that the repetitive nature of his job will always cause his symptoms to wax and wane.  He verbally consented to the cortisone injection and tolerated the procedure well.  He will follow-up for repeat evaluation in this office as needed.\par \par He expressed understanding of outlined care plan with no additional questions or concerns\par

## 2023-05-05 NOTE — PHYSICAL EXAM
[Normal Coordination] : normal coordination [Normal DTR UE/LE] : normal DTR UE/LE  [Normal Sensation] : normal sensation [Normal Mood and Affect] : normal mood and affect [Orientated] : orientated [Normal Skin] : normal skin [No Rash] : no rash [No Ulcers] : no ulcers [No obvious lymphadenopathy in areas examined] : no obvious lymphadenopathy in areas examined [No Lesions] : no lesions [Left] : left shoulder [] : no scapular winging

## 2023-05-11 ENCOUNTER — APPOINTMENT (OUTPATIENT)
Dept: NEUROSURGERY | Facility: CLINIC | Age: 38
End: 2023-05-11
Payer: COMMERCIAL

## 2023-05-11 VITALS — WEIGHT: 175 LBS | BODY MASS INDEX: 25.05 KG/M2 | HEIGHT: 70 IN

## 2023-05-11 DIAGNOSIS — Z86.59 PERSONAL HISTORY OF OTHER MENTAL AND BEHAVIORAL DISORDERS: ICD-10-CM

## 2023-05-11 DIAGNOSIS — G43.909 MIGRAINE, UNSPECIFIED, NOT INTRACTABLE, W/OUT STATUS MIGRAINOSUS: ICD-10-CM

## 2023-05-11 DIAGNOSIS — Z80.9 FAMILY HISTORY OF MALIGNANT NEOPLASM, UNSPECIFIED: ICD-10-CM

## 2023-05-11 DIAGNOSIS — Z78.9 OTHER SPECIFIED HEALTH STATUS: ICD-10-CM

## 2023-05-11 DIAGNOSIS — M47.812 SPONDYLOSIS W/OUT MYELOPATHY OR RADICULOPATHY, CERVICAL REGION: ICD-10-CM

## 2023-05-11 PROCEDURE — 99204 OFFICE O/P NEW MOD 45 MIN: CPT

## 2023-05-11 RX ORDER — NAPROXEN 500 MG/1
TABLET ORAL
Refills: 0 | Status: ACTIVE | COMMUNITY

## 2023-05-11 NOTE — ASSESSMENT
[FreeTextEntry1] : We have had a thorough discussion regarding his current condition, findings, and treatment options. Mr. ISRAEL is a 37-year-old gentleman presents with a 3-year history of neck pain.  MRI confirms mild cervical spondylosis.  I have recommended a course of physical therapy.  If symptoms continue we can consider interventional injections with pain management.  He does not need to consider surgery at this point in time.  I will see him back as needed.  Will call barring any issues. All questions answered today.\par \par \par Mitzy Barakat, MS BARAJAS\par Sri Jacobs MD \par \par \par

## 2023-05-11 NOTE — HISTORY OF PRESENT ILLNESS
[de-identified] : Mr. ISRAEL has a 3-year history of neck pain with occasional pain into his biceps.  At times he will experience some mild paresthesias in his hands.  He has isolated left shoulder pain for which he is under the care of his orthopedic specialist.  In regards to his cervical spine no recent physical therapy or pain management treatments.  No bowel bladder dysfunction.\par \par We have reviewed an MRI of the cervical spine today.  He is found to have mild cervical spondylosis with foraminal narrowing at C5-6.  No cord compression.  No cord signal changes.\par \par PHYSICAL EXAM: \par Constitutional: Well appearing, no distress\par HEENT: Normocephalic Atraumatic\par Psychiatric: Alert and oriented to all spheres, normal mood\par Pulmonary: No respiratory distress\par Neurologic: \par CN II-XII grossly intact\par Palpation: no pain to palpation \par Strength: Full strength in all major muscle groups\par Sensation: Full sensation to light touch in all extremities\par Reflexes: \par  2+ biceps\par  2+ triceps\par  No Banda's\par  No clonus\par Mild restriction in ROM cevical spine / left shoulder.\par Gait: steady, walking w/o assistance. \par \par \par \par \par \par \par

## 2023-06-05 ENCOUNTER — APPOINTMENT (OUTPATIENT)
Dept: NEUROSURGERY | Facility: CLINIC | Age: 38
End: 2023-06-05

## 2023-06-16 ENCOUNTER — EMERGENCY (EMERGENCY)
Facility: HOSPITAL | Age: 38
LOS: 0 days | Discharge: HOME | End: 2023-06-17
Attending: EMERGENCY MEDICINE
Payer: COMMERCIAL

## 2023-06-16 VITALS
DIASTOLIC BLOOD PRESSURE: 75 MMHG | SYSTOLIC BLOOD PRESSURE: 126 MMHG | RESPIRATION RATE: 18 BRPM | OXYGEN SATURATION: 99 % | WEIGHT: 175.05 LBS | TEMPERATURE: 97 F | HEART RATE: 79 BPM

## 2023-06-16 DIAGNOSIS — Z87.19 PERSONAL HISTORY OF OTHER DISEASES OF THE DIGESTIVE SYSTEM: ICD-10-CM
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
## 2023-06-16 DIAGNOSIS — F17.200 NICOTINE DEPENDENCE, UNSPECIFIED, UNCOMPLICATED: ICD-10-CM

## 2023-06-16 DIAGNOSIS — Z86.69 PERSONAL HISTORY OF OTHER DISEASES OF THE NERVOUS SYSTEM AND SENSE ORGANS: ICD-10-CM

## 2023-06-16 DIAGNOSIS — R10.31 RIGHT LOWER QUADRANT PAIN: ICD-10-CM

## 2023-06-16 DIAGNOSIS — Z98.890 OTHER SPECIFIED POSTPROCEDURAL STATES: Chronic | ICD-10-CM
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
## 2023-06-16 DIAGNOSIS — S39.011A STRAIN OF MUSCLE, FASCIA AND TENDON OF ABDOMEN, INITIAL ENCOUNTER: ICD-10-CM

## 2023-06-16 DIAGNOSIS — N48.89 OTHER SPECIFIED DISORDERS OF PENIS: Chronic | ICD-10-CM

## 2023-06-16 LAB
ALBUMIN SERPL ELPH-MCNC: 4.4 G/DL — SIGNIFICANT CHANGE UP (ref 3.5–5.2)
ALP SERPL-CCNC: 87 U/L — SIGNIFICANT CHANGE UP (ref 30–115)
ALT FLD-CCNC: 12 U/L — SIGNIFICANT CHANGE UP (ref 0–41)
ANION GAP SERPL CALC-SCNC: 6 MMOL/L — LOW (ref 7–14)
AST SERPL-CCNC: 19 U/L — SIGNIFICANT CHANGE UP (ref 0–41)
BASOPHILS # BLD AUTO: 0.07 K/UL — SIGNIFICANT CHANGE UP (ref 0–0.2)
BASOPHILS NFR BLD AUTO: 0.7 % — SIGNIFICANT CHANGE UP (ref 0–1)
BILIRUB SERPL-MCNC: 0.6 MG/DL — SIGNIFICANT CHANGE UP (ref 0.2–1.2)
BUN SERPL-MCNC: 17 MG/DL — SIGNIFICANT CHANGE UP (ref 10–20)
CALCIUM SERPL-MCNC: 9.2 MG/DL — SIGNIFICANT CHANGE UP (ref 8.4–10.5)
CHLORIDE SERPL-SCNC: 106 MMOL/L — SIGNIFICANT CHANGE UP (ref 98–110)
CO2 SERPL-SCNC: 29 MMOL/L — SIGNIFICANT CHANGE UP (ref 17–32)
CREAT SERPL-MCNC: 1 MG/DL — SIGNIFICANT CHANGE UP (ref 0.7–1.5)
EGFR: 101 ML/MIN/1.73M2 — SIGNIFICANT CHANGE UP
EOSINOPHIL # BLD AUTO: 0.33 K/UL — SIGNIFICANT CHANGE UP (ref 0–0.7)
EOSINOPHIL NFR BLD AUTO: 3.5 % — SIGNIFICANT CHANGE UP (ref 0–8)
GLUCOSE SERPL-MCNC: 99 MG/DL — SIGNIFICANT CHANGE UP (ref 70–99)
HCT VFR BLD CALC: 42 % — SIGNIFICANT CHANGE UP (ref 42–52)
HGB BLD-MCNC: 14.3 G/DL — SIGNIFICANT CHANGE UP (ref 14–18)
IMM GRANULOCYTES NFR BLD AUTO: 0.3 % — SIGNIFICANT CHANGE UP (ref 0.1–0.3)
LACTATE SERPL-SCNC: 0.8 MMOL/L — SIGNIFICANT CHANGE UP (ref 0.7–2)
LYMPHOCYTES # BLD AUTO: 4.11 K/UL — HIGH (ref 1.2–3.4)
LYMPHOCYTES # BLD AUTO: 43.7 % — SIGNIFICANT CHANGE UP (ref 20.5–51.1)
MCHC RBC-ENTMCNC: 28.8 PG — SIGNIFICANT CHANGE UP (ref 27–31)
MCHC RBC-ENTMCNC: 34 G/DL — SIGNIFICANT CHANGE UP (ref 32–37)
MCV RBC AUTO: 84.5 FL — SIGNIFICANT CHANGE UP (ref 80–94)
MONOCYTES # BLD AUTO: 0.46 K/UL — SIGNIFICANT CHANGE UP (ref 0.1–0.6)
MONOCYTES NFR BLD AUTO: 4.9 % — SIGNIFICANT CHANGE UP (ref 1.7–9.3)
NEUTROPHILS # BLD AUTO: 4.41 K/UL — SIGNIFICANT CHANGE UP (ref 1.4–6.5)
NEUTROPHILS NFR BLD AUTO: 46.9 % — SIGNIFICANT CHANGE UP (ref 42.2–75.2)
NRBC # BLD: 0 /100 WBCS — SIGNIFICANT CHANGE UP (ref 0–0)
PLATELET # BLD AUTO: 246 K/UL — SIGNIFICANT CHANGE UP (ref 130–400)
PMV BLD: 11 FL — HIGH (ref 7.4–10.4)
POTASSIUM SERPL-MCNC: 4.7 MMOL/L — SIGNIFICANT CHANGE UP (ref 3.5–5)
POTASSIUM SERPL-SCNC: 4.7 MMOL/L — SIGNIFICANT CHANGE UP (ref 3.5–5)
PROT SERPL-MCNC: 6.9 G/DL — SIGNIFICANT CHANGE UP (ref 6–8)
RBC # BLD: 4.97 M/UL — SIGNIFICANT CHANGE UP (ref 4.7–6.1)
RBC # FLD: 12 % — SIGNIFICANT CHANGE UP (ref 11.5–14.5)
SODIUM SERPL-SCNC: 141 MMOL/L — SIGNIFICANT CHANGE UP (ref 135–146)
WBC # BLD: 9.41 K/UL — SIGNIFICANT CHANGE UP (ref 4.8–10.8)
WBC # FLD AUTO: 9.41 K/UL — SIGNIFICANT CHANGE UP (ref 4.8–10.8)

## 2023-06-16 PROCEDURE — 99284 EMERGENCY DEPT VISIT MOD MDM: CPT

## 2023-06-16 PROCEDURE — 83605 ASSAY OF LACTIC ACID: CPT

## 2023-06-16 PROCEDURE — 99284 EMERGENCY DEPT VISIT MOD MDM: CPT | Mod: 25

## 2023-06-16 PROCEDURE — 80053 COMPREHEN METABOLIC PANEL: CPT

## 2023-06-16 PROCEDURE — 74177 CT ABD & PELVIS W/CONTRAST: CPT | Mod: MA

## 2023-06-16 PROCEDURE — 85025 COMPLETE CBC W/AUTO DIFF WBC: CPT

## 2023-06-16 PROCEDURE — 36415 COLL VENOUS BLD VENIPUNCTURE: CPT

## 2023-06-16 PROCEDURE — 74177 CT ABD & PELVIS W/CONTRAST: CPT | Mod: 26,MA

## 2023-06-16 RX ORDER — SODIUM CHLORIDE 9 MG/ML
1000 INJECTION, SOLUTION INTRAVENOUS ONCE
Refills: 0 | Status: COMPLETED | OUTPATIENT
Start: 2023-06-16 | End: 2023-06-16

## 2023-06-16 NOTE — ED ADULT TRIAGE NOTE - NSWEIGHTCALCTOOLDRUG_GEN_A_CORE
"Chief Complaint   Patient presents with   • Diabetes     Follow up on lab work   • Hypertension     Has his eye exam set up for 6/24       Subjective     Sylwia Spears is a 63 y.o. male being seen for a follow up appointment today regarding DM 2, HTN, Cervical spinal stenosis. He takes Metformin XR 500mg daily, switched from 1000mg once daily with food, due to diarrhea. He reports once monthly checks, last reading 120s.  He denies blurred vision, urinary changes. His eye exam is scheduled 6-  He is on Lisinopril 20mg daily for HTN. Denies CP, SOA, edema. He does not regulary exercise. He has daily pain from \"where I got electrocuted\" he has nerve pain. Pain rated a 5 of 10. He pain is mostly in upper back and shoulders. He takes duloxetine for this, which helps his pain.      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 It \"locks up.\" He reports the pain radiates to right elbow. Associated swelling , but no redness.     History of Present Illness     No Known Allergies      Current Outpatient Medications:   •  DULoxetine (CYMBALTA) 60 MG capsule, TAKE ONE CAPSULE BY MOUTH DAILY, Disp: 90 capsule, Rfl: 3  •  lisinopril (PRINIVIL,ZESTRIL) 20 MG tablet, TAKE ONE TABLET BY MOUTH DAILY, Disp: 90 tablet, Rfl: 2  •  metFORMIN ER (GLUCOPHAGE-XR) 500 MG 24 hr tablet, Take 1 tablet by mouth Daily With Breakfast., Disp: 60 tablet, Rfl: 3  •  niacin (NIACIN SR,NIASPAN ER) 500 MG CR capsule, Take 1 capsule by mouth Every Night., Disp: 90 capsule, Rfl: 0  •  Blood Gluc Meter Disp-Strips device, 1 each 2 (Two) Times a Day., Disp: 1 each, Rfl: 0  •  Blood Glucose Monitoring Suppl (ONE TOUCH ULTRA 2) w/Device kit, 1 Device 3 (Three) Times a Day., Disp: 1 each, Rfl: 0  •  glucose blood test strip, Use as instructed, Disp: 200 each, Rfl: 12  •  Lancets (OneTouch Delica Plus [id number]) misc, USE THREE TIMES A DAY, Disp: 200 each, Rfl: 1    The following portions of the " patient's history were reviewed and updated as appropriate: allergies, current medications, past family history, past medical history, past social history, past surgical history and problem list.    Review of Systems   Constitutional: Negative.    HENT: Negative.    Eyes: Negative.    Respiratory: Negative.  Negative for cough and shortness of breath.    Cardiovascular: Negative.  Negative for chest pain, palpitations and leg swelling.   Gastrointestinal: Negative.    Endocrine: Negative.    Genitourinary: Negative.    Musculoskeletal: Positive for arthralgias, neck pain and neck stiffness.   Allergic/Immunologic: Negative.  Negative for environmental allergies and immunocompromised state.   Neurological: Negative.    Hematological: Negative.    Psychiatric/Behavioral: Negative.    All other systems reviewed and are negative.      Assessment     Physical Exam  Vitals reviewed.   Constitutional:       Appearance: Normal appearance. He is obese.   Cardiovascular:      Rate and Rhythm: Normal rate and regular rhythm.      Pulses: Normal pulses.      Heart sounds: Normal heart sounds. No murmur heard.  Pulmonary:      Effort: Pulmonary effort is normal. No respiratory distress.      Breath sounds: Normal breath sounds. No stridor.   Musculoskeletal:      Right wrist: Swelling and tenderness present. No bony tenderness, snuff box tenderness or crepitus. Decreased range of motion. Normal pulse.      Cervical back: Neck supple.      Right lower leg: No edema.      Left lower leg: No edema.   Skin:     General: Skin is warm and dry.   Neurological:      General: No focal deficit present.      Mental Status: He is alert and oriented to person, place, and time.   Psychiatric:         Mood and Affect: Mood normal.         Behavior: Behavior normal.         Thought Content: Thought content normal.         Plan     His fasting labs were reviewed with the patient from last week.     Diagnoses and all orders for this visit:    1.  Type 2 diabetes mellitus without complication, without long-term current use of insulin (HCC) (Primary)  -     CBC & Differential; Future  -     Comprehensive Metabolic Panel; Future  -     Lipid Panel With / Chol / HDL Ratio; Future  -     Hemoglobin A1c; Future  -     Microalbumin / Creatinine Urine Ratio - Urine, Clean Catch; Future  -     Testosterone (Free & Total), LC / MS  -     CBC & Differential; Future  -     Lipid Panel With / Chol / HDL Ratio; Future  -     Hemoglobin A1c; Future  -     Comprehensive Metabolic Panel; Future    2. Benign essential HTN  -     CBC & Differential; Future  -     Comprehensive Metabolic Panel; Future  -     Lipid Panel With / Chol / HDL Ratio; Future  -     Hemoglobin A1c; Future  -     Microalbumin / Creatinine Urine Ratio - Urine, Clean Catch; Future  -     CBC & Differential; Future  -     Lipid Panel With / Chol / HDL Ratio; Future  -     Hemoglobin A1c; Future  -     Comprehensive Metabolic Panel; Future    3. Hyperlipidemia LDL goal <70  -     CBC & Differential; Future  -     Comprehensive Metabolic Panel; Future  -     Lipid Panel With / Chol / HDL Ratio; Future  -     Hemoglobin A1c; Future  -     Microalbumin / Creatinine Urine Ratio - Urine, Clean Catch; Future  -     CBC & Differential; Future  -     Lipid Panel With / Chol / HDL Ratio; Future  -     Hemoglobin A1c; Future  -     Comprehensive Metabolic Panel; Future    4. Cervical spinal stenosis  -     CBC & Differential; Future  -     Comprehensive Metabolic Panel; Future  -     Lipid Panel With / Chol / HDL Ratio; Future  -     Hemoglobin A1c; Future  -     Microalbumin / Creatinine Urine Ratio - Urine, Clean Catch; Future    5. Chronic pain of right wrist  -     XR Wrist 3+ View Right; Future  -     meloxicam (Mobic) 15 MG tablet; Take 1 tablet by mouth Daily.  Dispense: 14 tablet; Refill: 0  -     Elastic Bandages & Supports (Wrist Brace Deluxe/Right L-XL) misc; 1 each Daily.  Dispense: 1 each; Refill:  0    Other orders  -     Discontinue: Elastic Bandages & Supports (Wrist Brace Deluxe/Right L-XL) misc; 1 each Daily.  Dispense: 1 each; Refill: 0    HgbA1c at goal. Improved with XR Metformin daily.     LDL gaol, hold Niaspan due to lowering platelet count.     Cymbalta controls Spinal stenosis pain.     Follow up in 6 months w labs      used

## 2023-06-16 NOTE — ED PROVIDER NOTE - PATIENT PORTAL LINK FT
You can access the FollowMyHealth Patient Portal offered by [facility] by registering at the following website: [email or web address]. By joining [facility]’s FollowMyHealth portal, you will also be able to view your health information using other applications (apps) compatible with our system.

## 2023-06-16 NOTE — ED PROVIDER NOTE - ATTENDING APP SHARED VISIT CONTRIBUTION OF CARE
I have personally performed a history and physical exam on this patient and personally directed the management of the patient. 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 patient has a history of inguinal and umbilical hernias in the past and states that he is worried that this feels similar pain described as aching in nature moderate no vomiting no diarrhea no fevers no chills no testicular pain no dysuria hesitancy or frequency    On physical exam patient is normocephalic atraumatic pupils equal round reactive 1 combination extraocular muscles intact oropharynx clear chest clear auscultation bilaterally abdomen soft tender palpation right lower quadrant no guarding or rebound extremities full range of motion no focal deficits noted    Assessment plan considering this patient's past medical history of multiple hernias in combination with the fact that he had sudden onset of pain after lifting a metal material we obtained a CT scan to evaluate for hernia despite the fact that I do not appreciate any inguinal masses or umbilical masses which was negative patient improved here in the emergency department I will discharge at this time follow-up with his PMD discussed indications

## 2023-06-16 NOTE — ED PROVIDER NOTE - OBJECTIVE STATEMENT
patient c/o RLQ abd pain while working, Felt pull while lifting, H/o inguinal and umbilical hernia repairs in the past,

## 2023-06-16 NOTE — ED PROVIDER NOTE - CARE PROVIDER_API CALL
Simone Pelayo  Surgery  [address] [place] [address]  Phone: [phone]  Fax: [phone]  Follow Up Time:

## 2023-06-16 NOTE — ED PROVIDER NOTE - NS ED ATTENDING STATEMENT MOD
This was a shared visit with the MELISA. I reviewed and verified the documentation and independently performed the documented:

## 2023-06-16 NOTE — ED PROVIDER NOTE - NSICDXPASTMEDICALHX_GEN_ALL_CORE_FT
PAST MEDICAL HISTORY:  Current smoker     H/O drug abuse Past use in Teen yrs: Cocaine, Acid, Mushroom  Current Marijuana use    Seizure 1 episode june 2017

## 2023-06-16 NOTE — ED PROVIDER NOTE - CARE PROVIDERS DIRECT ADDRESSES
ED Attending Physician Note      Patient : Naomi Davila Age: 51 year old Sex: female   MRN: [id number] Encounter Date: 12/15/2021      History     Chief Complaint   Patient presents with   • Back Pain     Patient is a 51-year-old female who has previous history of back surgery has chronic back pain has been ongoing for years daily had an MRI in October which showed worsening spinal stenosis and disc herniation L3-L4 was seen on the 10th by Dr. Lim who recommended MIS TLIF L3-4 and 5.  Patient has been taking clonazepam for pain patient presents emergency room with worsening exacerbation of her chronic lower back pain she denies any trauma any falls she denies any loss of bladder or bowel continence denies any numbness or weakness to her lower extremities no fevers no anterior abdominal pain no urinary symptoms               L3/4 HNP and L4/5 disc degeneration with nerve root compression  I recoomend MIS TLIF L3/4/5    IMPRESSION:  Worsening spinal stenosis and disc herniation at L3-L4.  Bony lesion at L3 as described above.  This demonstrates enhancement on the  postcontrast study.  See discussion above.  Broad-based disc bulge paracentral to the left at L4-L5.  Narrowing of the  left neural foramina is seen     Please note that there is an area of enhancement surrounding the left facet  joints at L3-L4.  There is increase signal intensity in the pedicle at this  level.  Findings may be posttraumatic or inflammatory in nature.    Allergies   Allergen Reactions   • Aspirin HIVES   • Ibuprofen HIVES       Current Discharge Medication List      Prior to Admission Medications    Details   traMADol (ULTRAM) 50 MG tablet Take 1 tablet by mouth every 6 hours as needed for Pain.  Qty: 20 tablet, Refills: 0      pregabalin (LYRICA) 75 MG capsule 1 tab po q HS x 1 wk, then 2 tabs po q HS thereafter  Qty: 60 capsule, Refills: 2      traMADol (ULTRAM) 50 MG tablet Take 1 tablet by mouth every 6 hours as needed for Pain.  Qty:  12 tablet, Refills: 0      levothyroxine 125 MCG tablet TK 1 T PO QAM      amLODIPine (NORVASC) 10 MG tablet TK 1 T PO QAM      hydrALAZINE (APRESOLINE) 100 MG tablet TK 1 T PO TID      HYDROcodone-acetaminophen (NORCO) 5-325 MG per tablet TK 1 T PO Q 6 H PRN P      lamoTRIgine (LaMICtal) 100 MG tablet TK 2 TS PO QD      levoFLOXacin (LEVAQUIN) 500 MG tablet TK 1 T PO D      lisinopril (ZESTRIL) 40 MG tablet TK 1 T PO BID      zolpidem (AMBIEN) 10 MG tablet TK 1 T PO QHS      albuterol 108 (90 Base) MCG/ACT inhaler INHALE 1 PUFF Q 4 H PRN      atorvastatin (LIPITOR) 10 MG tablet Take 10 mg by mouth.      clonazePAM (KLONOPIN) 0.5 MG tablet Take 0.5 mg by mouth.      escitalopram (LEXAPRO) 20 MG tablet Take 10 mg by mouth.      hydrALAZINE (APRESOLINE) 50 MG tablet TK 1 T PO TID      lisinopril (ZESTRIL) 20 MG tablet TK 1 T PO  QAM      losartan-hydrochlorothiazide (HYZAAR) 100-12.5 MG per tablet Take 1 tablet by mouth.      meloxicam (MOBIC) 15 MG tablet TK 1 T PO QAM      methylPREDNISolone (MEDROL DOSEPAK) 4 MG tablet FPD      metoPROLOL succinate (TOPROL-XL) 25 MG 24 hr tablet TK 1 T PO Q MORNING      omeprazole (PRILOSEC) 20 MG capsule TK 1 C PO QD 30 MIN AC      QUEtiapine (SEROQUEL) 50 MG tablet TK 1 T PO TID             Current Discharge Medication List          Past Medical History:   Diagnosis Date   • Anxiety    • Essential (primary) hypertension    • GERD (gastroesophageal reflux disease)    • Hypocholesteremia    • Thyroid disease        Past Surgical History:   Procedure Laterality Date   • BACK SURGERY  08/06/2020    cervical diskectomy C5-6, [facility], Dr Luis E Zambrano   • SPINE SURGERY         Family History   Problem Relation Age of Onset   • Cancer Mother    • Diabetes Mother    • Hypertension Mother    • Cancer Father    • Diabetes Father    • Hypertension Father        Social History     Tobacco Use   • Smoking status: Light Tobacco Smoker     Types: Cigarettes   • Smokeless tobacco: Never Used    Substance Use Topics   • Alcohol use: Yes   • Drug use: Not Currently     Types: Marijuana       Review of Systems   Constitutional: Negative for activity change, chills and fever.   HENT: Negative.  Negative for congestion and sore throat.    Eyes: Negative for discharge.   Respiratory: Negative for cough and wheezing.    Cardiovascular: Negative for chest pain and leg swelling.   Gastrointestinal: Negative for abdominal pain, diarrhea, nausea and vomiting.   Endocrine: Negative.    Genitourinary: Negative for dysuria.   Musculoskeletal: Positive for back pain. Negative for joint swelling.   Skin: Negative for rash.   Allergic/Immunologic: Negative.    Neurological: Negative for syncope.   Hematological: Does not bruise/bleed easily.   Psychiatric/Behavioral: Negative.    All other systems reviewed and are negative.      Physical Exam     No data found.      Physical Exam  Vitals (99% on ra nl pulse ox per my interpretation, ) and nursing note reviewed.   Constitutional:       General: She is not in acute distress.     Appearance: Normal appearance. She is obese. She is not ill-appearing or toxic-appearing.   HENT:      Head: Normocephalic and atraumatic.      Right Ear: External ear normal.      Left Ear: External ear normal.      Nose: Nose normal.      Mouth/Throat:      Mouth: Mucous membranes are moist.      Pharynx: No oropharyngeal exudate or posterior oropharyngeal erythema.   Eyes:      Conjunctiva/sclera: Conjunctivae normal.   Cardiovascular:      Rate and Rhythm: Normal rate and regular rhythm.      Pulses: Normal pulses.      Heart sounds: Normal heart sounds. No murmur heard.      Pulmonary:      Effort: Pulmonary effort is normal. No respiratory distress.      Breath sounds: Normal breath sounds. No stridor. No wheezing.   Abdominal:      General: Abdomen is flat. Bowel sounds are normal.      Palpations: Abdomen is soft. There is no mass.      Tenderness: There is no abdominal tenderness. There is  no guarding or rebound.   Musculoskeletal:         General: No swelling.      Cervical back: Neck supple.      Comments: Patient has diffuse lumbar tenderness there is no point tenderness no step-offs has normal perianal sensation and rectal tone   Skin:     General: Skin is warm.      Capillary Refill: Capillary refill takes less than 2 seconds.      Findings: No rash.   Neurological:      General: No focal deficit present.      Mental Status: She is alert and oriented to person, place, and time.      Comments:   5./5 strength to quadriceps, dorsal and pedal flexion B  Sensation is intact B  LE to gross touch           Psychiatric:         Mood and Affect: Mood normal.         Behavior: Behavior normal.         Thought Content: Thought content normal.         ED Course     Procedures    Lab Results     No results found for this visit on 12/15/21.    EKG Results         Radiology Results     Imaging Results    None         ED Medication Orders (From admission, onward)    Ordered Start     Status Ordering Provider    12/15/21 1552 12/15/21 1600  ondansetron (ZOFRAN) injection 4 mg  (ED Adult Renal Colic)  ONCE         Last MAR action: Given JAMA GIBBONS          ED Course as of 12/15/21 1842   Wed Dec 15, 2021   1643 Perfect serve Dr. Lim [NL]   1644 Will give steroid burst pain medications and needs to follow-up with Dr. Lim for planned surgery in January [NL]   1644 WBC(!): 16.7 [NL]   1644 HGB: 15.3 [NL]   1644 PLT: 376 [NL]   1702 WBC(!): 16.7 [NL]   1838 LEUKOCYTES, URINALYSIS(!): 6 to 10 [NL]   1838 LEUKOCYTE ESTERASE(!): Small [NL]   1838 BACTERIA, URINALYSIS(!): Moderate [NL]   1838 LEUKOCYTES, URINALYSIS(!): 6 to 10 [NL]   1838 WBC(!): 16.7 [NL]   1838 Creatinine: 0.59 [NL]   1838 Creatinine: 0.59 [NL]   1842 BP: 113/71  BP improved with pain control [NL]      ED Course User Index  [NL] Pam Rosa MD       MDM  Number of Diagnoses or Management Options  Spinal stenosis of lumbar region  without neurogenic claudication  Diagnosis management comments: Patient is a 51-year-old female with known lower back pain reviewed recent MRI and visit with Dr. Lim has known spinal stenosis and disc herniation was recommended for surgical intervention.  Denies any new trauma is coming in with exacerbation of pain has no concerns clinically for spinal cord compression no loss of bladder bowel function has normal strength and sensation to lower extremities given Solu-Medrol Norco and morphine discussed with pt's neuro surgeon will dc home with neuro sx f/u denies any urinary symptoms but has a mild leukocytosis possible UTI no CVA tenderness normal creatinine will start on cephalexin and send urine for culture patient ambulatory around the ED without assistance pain is improved will DC home with prednisone and Norco      Clinical Impression     ED Diagnosis   1. Spinal stenosis of lumbar region without neurogenic claudication     2. Acute cystitis without hematuria         Disposition        Discharge after Treatment 12/15/2021  4:41 PM  There is no comment      Pam Rosa MD   12/15/2021 4:33 PM                      Pam Rosa MD  12/15/21 [id number]     ,DirectAddress_Unknown

## 2023-06-16 NOTE — ED PROVIDER NOTE - NSICDXPASTSURGICALHX_GEN_ALL_CORE_FT
PAST SURGICAL HISTORY:  H/O hernia repair Umbilical and LIH Repairs 2015 or 2016    Penile cyst Excision of Cyst 3+ yrs ago

## 2023-06-16 NOTE — ED PROVIDER NOTE - CLINICAL SUMMARY MEDICAL DECISION MAKING FREE TEXT BOX
considering this patient's past medical history of multiple hernias in combination with the fact that he had sudden onset of pain after lifting a metal material we obtained a CT scan to evaluate for hernia despite the fact that I do not appreciate any inguinal masses or umbilical masses which was negative patient improved here in the emergency department I will discharge at this time follow-up with his PMD discussed indications

## 2023-06-17 VITALS
HEART RATE: 72 BPM | SYSTOLIC BLOOD PRESSURE: 128 MMHG | OXYGEN SATURATION: 99 % | DIASTOLIC BLOOD PRESSURE: 70 MMHG | TEMPERATURE: 98 F | RESPIRATION RATE: 16 BRPM

## 2023-06-17 NOTE — ED ADULT NURSE NOTE - NSFALLUNIVINTERV_ED_ALL_ED
Bed/Stretcher in lowest position, wheels locked, appropriate side rails in place/Call bell, personal items and telephone in reach/Instruct patient to call for assistance before getting out of bed/chair/stretcher/Non-slip footwear applied when patient is off stretcher/[place] to call system/Physically safe environment - no spills, clutter or unnecessary equipment/Purposeful proactive rounding/Room/bathroom lighting operational, light cord in reach

## 2023-08-01 ENCOUNTER — APPOINTMENT (OUTPATIENT)
Dept: SURGERY | Facility: CLINIC | Age: 38
End: 2023-08-01

## 2023-08-03 NOTE — ED ADULT NURSE NOTE - NSSISCREENINGQ2_ED_A_ED
No Epidermal Autograft Text: The defect edges were debeveled with a #15 scalpel blade. Given the location of the defect, shape of the defect and the proximity to free margins an epidermal autograft was deemed most appropriate. Using a sterile surgical marker, the primary defect shape was transferred to the donor site. The epidermal graft was then harvested.  The skin graft was then placed in the primary defect and oriented appropriately.

## 2023-11-25 ENCOUNTER — EMERGENCY (EMERGENCY)
Facility: HOSPITAL | Age: 38
LOS: 0 days | Discharge: HOME | End: 2023-11-26
Attending: EMERGENCY MEDICINE
Payer: OTHER MISCELLANEOUS

## 2023-11-25 VITALS
OXYGEN SATURATION: 99 % | DIASTOLIC BLOOD PRESSURE: 89 MMHG | TEMPERATURE: 98 F | HEIGHT: 69 IN | SYSTOLIC BLOOD PRESSURE: 132 MMHG | HEART RATE: 82 BPM | WEIGHT: 179.9 LBS | RESPIRATION RATE: 18 BRPM

## 2023-11-25 DIAGNOSIS — S49.92XA UNSPECIFIED INJURY OF LEFT SHOULDER AND UPPER ARM, INITIAL ENCOUNTER: ICD-10-CM

## 2023-11-25 DIAGNOSIS — M25.511 PAIN IN RIGHT SHOULDER: ICD-10-CM

## 2023-11-25 DIAGNOSIS — S49.91XA UNSPECIFIED INJURY OF RIGHT SHOULDER AND UPPER ARM, INITIAL ENCOUNTER: ICD-10-CM

## 2023-11-25 DIAGNOSIS — M25.512 PAIN IN LEFT SHOULDER: ICD-10-CM

## 2023-11-25 DIAGNOSIS — Z98.890 OTHER SPECIFIED POSTPROCEDURAL STATES: Chronic | ICD-10-CM

## 2023-11-25 DIAGNOSIS — N48.89 OTHER SPECIFIED DISORDERS OF PENIS: Chronic | ICD-10-CM
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
## 2023-11-25 DIAGNOSIS — Z87.828 PERSONAL HISTORY OF OTHER (HEALED) PHYSICAL INJURY AND TRAUMA: ICD-10-CM
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
## 2023-11-25 PROCEDURE — 99284 EMERGENCY DEPT VISIT MOD MDM: CPT

## 2023-11-25 PROCEDURE — 99283 EMERGENCY DEPT VISIT LOW MDM: CPT | Mod: 25

## 2023-11-25 PROCEDURE — 73030 X-RAY EXAM OF SHOULDER: CPT | Mod: 50

## 2023-11-25 PROCEDURE — 73030 X-RAY EXAM OF SHOULDER: CPT | Mod: 26,LT

## 2023-11-25 RX ORDER — IBUPROFEN 200 MG
600 TABLET ORAL ONCE
Refills: 0 | Status: COMPLETED | OUTPATIENT
Start: 2023-11-25 | End: 2023-11-25

## 2023-11-25 NOTE — ED PROVIDER NOTE - CLINICAL SUMMARY MEDICAL DECISION MAKING FREE TEXT BOX
X-rays obtained and interpreted by me.  No acute fracture or dislocation.  I discussed results with patient.  Patient is to continue taking Tylenol and Motrin as needed.  Recommend ice to area.  Patient states that he will need to follow-up with orthopedic for further evaluation.

## 2023-11-25 NOTE — ED PROVIDER NOTE - PATIENT PORTAL LINK FT
You can access the FollowMyHealth Patient Portal offered by [facility] by registering at the following website: [email or web address]. By joining [employer]’s FollowMyHealth portal, you will also be able to view your health information using other applications (apps) compatible with our system.

## 2023-11-25 NOTE — ED PROVIDER NOTE - PHYSICAL EXAMINATION
Patient is AAOx3, no signs of head trauma, clear speech  No midline vertebral tenderness or step-off  Positive tenderness to left anterior shoulder with pain with flexion and abduction, clavicles intact, no step-off, right shoulder with soreness, good range of motion  Skin is intact  Positive distal pulses bilateral  Good tone and equal strength

## 2023-11-25 NOTE — ED PROVIDER NOTE - PROVIDER TOKENS
PROVIDER:[TOKEN:[[id number]]],PROVIDER:[TOKEN:[[id number]]],PROVIDER:[TOKEN:[[id number]]],PROVIDER:[TOKEN:[[id number]]]

## 2023-11-25 NOTE — ED PROVIDER NOTE - CARE PROVIDER_API CALL
Darion Napier  Orthopaedic Surgery  [address] [facility],  [address]  Phone: [phone]  Fax: [phone]  Follow Up Time:     Yuriy Templeton  Orthopaedic Surgery  [address] [place] [address]  Phone: [phone]  Fax: [phone]  Follow Up Time:     Javi Avery  Orthopaedic Surgery  [address] [facility], [address]  [place] [address]  Phone: [phone]  Fax: [phone]  Follow Up Time:     Adis Eller  Orthopaedic Surgery  [address] [place] [address]  Phone: [phone]  Fax: [phone]  Follow Up Time:

## 2023-11-25 NOTE — ED PROVIDER NOTE - NSFOLLOWUPINSTRUCTIONS_ED_ALL_ED_FT
Shoulder Pain    Many things can cause shoulder pain, including:    An injury to the area.  Overuse of the shoulder.  Arthritis.    The source of the pain can be:    Inflammation.  An injury to the shoulder joint.  An injury to a tendon, ligament, or bone.    HOME CARE INSTRUCTIONS  Take these actions to help with your pain:     Squeeze a soft ball or a foam pad as much as possible. This helps to keep the shoulder from swelling. It also helps to strengthen the arm.  Take over-the-counter and prescription medicines only as told by your health care provider.  If directed, apply ice to the area:  Put ice in a plastic bag.  Place a towel between your skin and the bag.  Leave the ice on for 20 minutes, 2–3 times per day. Stop applying ice if it does not help with the pain.  If you were given a shoulder sling or immobilizer:  Wear it as told.  Remove it to shower or bathe.  Move your arm as little as possible, but keep your hand moving to prevent swelling.    SEEK MEDICAL CARE IF:  Your pain gets worse.  Your pain is not relieved with medicines.  New pain develops in your arm, hand, or fingers.    SEEK IMMEDIATE MEDICAL CARE IF:  Your arm, hand, or fingers:  Tingle.  Become numb.  Become swollen.  Become painful.  Turn white or blue.    ADDITIONAL NOTES AND INSTRUCTIONS    Please follow up with your Primary MD in 24-48 hr.  Seek immediate medical care for any new/worsening signs or symptoms.
Corwin Haynes)  Clinical Neurophysiology; Neurology; Sleep Medicine  [address] [facility], [address] [place] [address]  Phone: [phone]  Fax: [phone]  Follow Up Time: 1 week

## 2023-11-25 NOTE — ED PROVIDER NOTE - OBJECTIVE STATEMENT
38-year-old male history of left-sided partial bicep tendon tear presents for evaluation of bilateral shoulder pain left more than right status post fall on ladder earlier this evening while at work.  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  Patient states to prevent himself from falling off he reached up to hold on to the wrong with his left arm and grabbed the side with his right arm causing him to have pain in both shoulders.  Patient states he went home and applied ice with little improvement so comes to ED for evaluation.  No numbness or tingling, no neck pain

## 2023-11-26 RX ADMIN — Medication 600 MILLIGRAM(S): at 00:03

## 2023-11-26 NOTE — ED ADULT NURSE NOTE - NSFALLHARMRISKINTERV_ED_ALL_ED
